# Patient Record
Sex: MALE | Race: WHITE | URBAN - METROPOLITAN AREA
[De-identification: names, ages, dates, MRNs, and addresses within clinical notes are randomized per-mention and may not be internally consistent; named-entity substitution may affect disease eponyms.]

---

## 2022-12-05 ENCOUNTER — OFFICE VISIT (OUTPATIENT)
Dept: URBAN - METROPOLITAN AREA CLINIC 82 | Facility: CLINIC | Age: 42
End: 2022-12-05
Payer: COMMERCIAL

## 2022-12-05 DIAGNOSIS — H52.03 HYPERMETROPIA, BILATERAL: Primary | ICD-10-CM

## 2022-12-05 PROCEDURE — 92004 COMPRE OPH EXAM NEW PT 1/>: CPT | Performed by: OPTOMETRIST

## 2022-12-05 ASSESSMENT — VISUAL ACUITY
OS: 20/20
OD: 20/20

## 2022-12-05 ASSESSMENT — INTRAOCULAR PRESSURE
OD: 14
OS: 16

## 2022-12-05 ASSESSMENT — KERATOMETRY
OS: 45.00
OD: 44.50

## 2022-12-05 NOTE — IMPRESSION/PLAN
Impression: Hypermetropia, bilateral: H52.03. Plan: Educated pt on exam findings. Updated and finalized SRx. Educated pt SRx optional at this time, discussed benefit for anti-fatigue lens. Educated pt on 1-2 week adaptation period with updated SRx. Pt expressed understanding. RTC 1 year for CE, or PRN. Pt declined dilation today. Educated pt the periphery of the back of the eye was unable to be assessed without dilation or photos, pt expressed understanding. Educated pt on signs/symptoms of RDs and RTC ASAP if changes in vision occur.